# Patient Record
Sex: MALE | Race: ASIAN | NOT HISPANIC OR LATINO | URBAN - METROPOLITAN AREA
[De-identification: names, ages, dates, MRNs, and addresses within clinical notes are randomized per-mention and may not be internally consistent; named-entity substitution may affect disease eponyms.]

---

## 2019-08-30 ENCOUNTER — EMERGENCY (EMERGENCY)
Facility: HOSPITAL | Age: 34
LOS: 0 days | Discharge: HOME | End: 2019-08-30
Attending: EMERGENCY MEDICINE
Payer: SELF-PAY

## 2019-08-30 VITALS
WEIGHT: 132.28 LBS | HEART RATE: 84 BPM | SYSTOLIC BLOOD PRESSURE: 129 MMHG | TEMPERATURE: 98 F | DIASTOLIC BLOOD PRESSURE: 76 MMHG | OXYGEN SATURATION: 96 % | RESPIRATION RATE: 20 BRPM | HEIGHT: 65 IN

## 2019-08-30 VITALS
RESPIRATION RATE: 20 BRPM | HEART RATE: 84 BPM | DIASTOLIC BLOOD PRESSURE: 75 MMHG | SYSTOLIC BLOOD PRESSURE: 127 MMHG | OXYGEN SATURATION: 98 % | TEMPERATURE: 99 F

## 2019-08-30 DIAGNOSIS — R06.00 DYSPNEA, UNSPECIFIED: ICD-10-CM

## 2019-08-30 DIAGNOSIS — B34.9 VIRAL INFECTION, UNSPECIFIED: ICD-10-CM

## 2019-08-30 DIAGNOSIS — R06.2 WHEEZING: ICD-10-CM

## 2019-08-30 DIAGNOSIS — R05 COUGH: ICD-10-CM

## 2019-08-30 PROCEDURE — 99284 EMERGENCY DEPT VISIT MOD MDM: CPT

## 2019-08-30 PROCEDURE — 99053 MED SERV 10PM-8AM 24 HR FAC: CPT

## 2019-08-30 PROCEDURE — 71046 X-RAY EXAM CHEST 2 VIEWS: CPT | Mod: 26

## 2019-08-30 RX ORDER — DEXAMETHASONE 0.5 MG/5ML
10 ELIXIR ORAL ONCE
Refills: 0 | Status: COMPLETED | OUTPATIENT
Start: 2019-08-30 | End: 2019-08-30

## 2019-08-30 RX ORDER — ALBUTEROL 90 UG/1
1 AEROSOL, METERED ORAL ONCE
Refills: 0 | Status: COMPLETED | OUTPATIENT
Start: 2019-08-30 | End: 2019-08-30

## 2019-08-30 RX ORDER — IPRATROPIUM/ALBUTEROL SULFATE 18-103MCG
3 AEROSOL WITH ADAPTER (GRAM) INHALATION
Refills: 0 | Status: COMPLETED | OUTPATIENT
Start: 2019-08-30 | End: 2019-08-30

## 2019-08-30 RX ADMIN — Medication 10 MILLIGRAM(S): at 02:53

## 2019-08-30 RX ADMIN — Medication 3 MILLILITER(S): at 03:09

## 2019-08-30 RX ADMIN — Medication 3 MILLILITER(S): at 02:50

## 2019-08-30 RX ADMIN — Medication 3 MILLILITER(S): at 02:30

## 2019-08-30 RX ADMIN — ALBUTEROL 1 PUFF(S): 90 AEROSOL, METERED ORAL at 03:43

## 2019-08-30 NOTE — ED PROVIDER NOTE - CARE PLAN
"Subjective:       Patient ID: Zaheer Wall is a 32 y.o. male.    Chief Complaint: Follow-up (pt is here to follow up since he was started on Adderall and Zoloft at his last visit)    Feels Adderall doesn't work as well as it used to, doesn't last all day. No adverse side effects      Review of Systems   Constitutional: Negative for appetite change.   Cardiovascular: Negative for chest pain and palpitations.   Psychiatric/Behavioral: Negative for self-injury and suicidal ideas. The patient is nervous/anxious.        Objective:      Vitals:    01/26/18 0906   BP: (!) 141/89   BP Location: Left arm   Patient Position: Sitting   BP Method: Large (Automatic)   Pulse: 75   Resp: 18   Temp: 98 °F (36.7 °C)   TempSrc: Oral   SpO2: 98%   Weight: 98 kg (216 lb)   Height: 5' 11" (1.803 m)     Physical Exam   Constitutional: He is oriented to person, place, and time. He appears well-developed and well-nourished.   HENT:   Head: Normocephalic and atraumatic.   Cardiovascular: Normal rate, regular rhythm and normal heart sounds.    Pulmonary/Chest: Effort normal and breath sounds normal.   Musculoskeletal: He exhibits no edema.   Neurological: He is alert and oriented to person, place, and time.   Skin: Skin is warm and dry.   Psychiatric: His speech is normal and behavior is normal. His mood appears anxious.   Nursing note and vitals reviewed.      Assessment:       1. Attention deficit hyperactivity disorder (ADHD), combined type Sub-optimally controlled   2. Anxiety    3. PTSD (post-traumatic stress disorder)        Plan:       Attention deficit hyperactivity disorder (ADHD), combined type  -     dextroamphetamine-amphetamine (ADDERALL XR) 30 MG 24 hr capsule; Take 1 capsule (30 mg total) by mouth every morning.  Dispense: 30 capsule; Refill: 0    Anxiety  -     clorazepate (TRANXENE) 7.5 MG Tab; Take 1 tablet (7.5 mg total) by mouth daily as needed (anxiety).  Dispense: 30 tablet; Refill: 2    PTSD (post-traumatic stress " disorder)  -     sertraline (ZOLOFT) 50 MG tablet; Take 1 tablet (50 mg total) by mouth once daily.  Dispense: 30 tablet; Refill: 5      Medication List with Changes/Refills   New Medications    DEXTROAMPHETAMINE-AMPHETAMINE (ADDERALL XR) 30 MG 24 HR CAPSULE    Take 1 capsule (30 mg total) by mouth every morning.   Changed and/or Refilled Medications    Modified Medication Previous Medication    CLORAZEPATE (TRANXENE) 7.5 MG TAB clorazepate (TRANXENE) 7.5 MG Tab       Take 1 tablet (7.5 mg total) by mouth daily as needed (anxiety).    Take 1 tablet (7.5 mg total) by mouth daily as needed (anxiety).    SERTRALINE (ZOLOFT) 50 MG TABLET sertraline (ZOLOFT) 50 MG tablet       Take 1 tablet (50 mg total) by mouth once daily.    Take 1 tablet (50 mg total) by mouth once daily.   Discontinued Medications    AMANTADINE HCL (SYMMETREL) 100 MG CAPSULE        DEXTROAMPHETAMINE-AMPHETAMINE (ADDERALL) 20 MG TABLET    Take 1 tablet by mouth once daily.          Principal Discharge DX:	Viral syndrome

## 2019-08-30 NOTE — ED ADULT NURSE NOTE - NSIMPLEMENTINTERV_GEN_ALL_ED
Implemented All Universal Safety Interventions:  [place] to call system. Call bell, personal items and telephone within reach. Instruct patient to call for assistance. Room bathroom lighting operational. Non-slip footwear when patient is off stretcher. Physically safe environment: no spills, clutter or unnecessary equipment. Stretcher in lowest position, wheels locked, appropriate side rails in place.

## 2019-08-30 NOTE — ED PROVIDER NOTE - PATIENT PORTAL LINK FT
You can access the FollowMyHealth Patient Portal offered by [facility] by registering at the following website: [email or web address]. By joining [facility]’s FollowMyHealth portal, you will also be able to view your health information using other applications (apps) compatible with our system.

## 2019-08-30 NOTE — ED PROVIDER NOTE - OBJECTIVE STATEMENT
Pertinent PMH/PSH/FHx/SHx and Review of Systems contained within:  33 yo m with no pmh presents in ED c/o 1 week history of sinus pressure, cough, wheezing and sob. No aggravating or relieving factors, No fever/chills, No photophobia/eye pain/changes in vision, No ear pain/sore throat/dysphagia, No chest pain/palpitations, no stridor, No abdominal pain, No N/V/D, no dysuria/frequency/discharge, No neck/back pain, no rash, no changes in neurological status/function.

## 2019-08-30 NOTE — ED PROVIDER NOTE - CLINICAL SUMMARY MEDICAL DECISION MAKING FREE TEXT BOX
patient in good condition. d/c with care instructions. f/up with pmd. Discussed results and outcome of testing with the patient.  Patient advised to please follow up with their primary care doctor within the next 24 hours and return to the Emergency Department for worsening symptoms or any other concerns.  Patient advised that their doctor may call  to follow up on the specific results of the tests performed today in the emergency department.

## 2024-07-11 NOTE — ED ADULT NURSE NOTE - INTEGUMENTARY WDL
Last visit: 5/30/24  Last Med refill: 1/8/24  Does patient have enough medication for 72 hours: No:     Next Visit Date:  Future Appointments   Date Time Provider Department Center   8/14/2024  4:00 PM Latosha Lucero APRN - NP [facility]   8/27/2024  3:45 PM Steven Buenrostro PA-C  [facility]   [facility]       [facility] Maintenance   Topic Date Due    Varicella vaccine (1 of 2 - 2-dose childhood series) Never done    Hepatitis B vaccine (3 of 3 - 19+ 3-dose series) 07/11/2021    Flu vaccine (1) 08/01/2024    Depression Monitoring  02/07/2025    Lipids  02/09/2025    Pap smear  08/01/2025    DTaP/Tdap/Td vaccine (2 - Td or Tdap) 05/30/2034    Hepatitis A vaccine  Completed    COVID-19 Vaccine  Completed    Hepatitis C screen  Completed    HIV screen  Completed    Hib vaccine  Aged Out    HPV vaccine  Aged Out    Polio vaccine  Aged Out    Meningococcal (ACWY) vaccine  Aged Out    Pneumococcal 0-64 years Vaccine  Aged Out    Chlamydia/GC screen  Discontinued       No results found for: \"LABA1C\"          ( goal A1C is < 7)   No components found for: \"LABMICR\"  No components found for: \"LDLCHOLESTEROL\", \"LDLCALC\"    (goal LDL is <100)   AST (U/L)   Date Value   03/10/2022 44     ALT (U/L)   Date Value   03/10/2022 68     BUN (mg/dL)   Date Value   03/10/2022 11     BP Readings from Last 3 Encounters:   05/30/24 122/72   04/08/24 126/79   02/09/24 122/84          (goal 120/80)    All Future Testing planned in [facility]  Lab Frequency Next Occurrence   Lipid Panel Once 08/28/2023   MRI FOOT LEFT WO CONTRAST Once 02/09/2024   MRI FOOT RIGHT WO CONTRAST Once 02/09/2024   Hepatic Function Panel Once 04/08/2024   XR CHEST (2 VW) Once 05/30/2024               Patient Active Problem List:     Depression with anxiety     Excessive daytime sleepiness     Flat feet, bilateral     Pain in both feet     Hypothyroidism     Vitamin D deficiency     On statin therapy      Color consistent with ethnicity/race, warm, dry intact, resilient.

## 2024-09-09 NOTE — ED ADULT TRIAGE NOTE - NSWEIGHTCALCTOOLDRUG_GEN_A_CORE
Med Changes:    - Xarelto:  Decrease from 20 mg daily to 15 mg daily    - Torsemide:  Increase from 20 mg daily to 60 mg daily  - Potassium:  New medication (take 1 tablet once daily)      Follow-up:    - Weigh yourself at the same time every day  - Get labs drawn on Thursday, 9/12, and we will advise you further    used